# Patient Record
Sex: FEMALE
[De-identification: names, ages, dates, MRNs, and addresses within clinical notes are randomized per-mention and may not be internally consistent; named-entity substitution may affect disease eponyms.]

---

## 2024-07-25 ENCOUNTER — CARE COORDINATION (OUTPATIENT)
Dept: OTHER | Facility: CLINIC | Age: 74
End: 2024-07-25

## 2024-07-25 NOTE — CARE COORDINATION
Ambulatory Care Coordination Note     7/25/2024 10:00 AM     Patient outreach attempt by this AC today to offer care management services. ACM was unable to reach the patient by telephone today; left voice message requesting a return phone call to this ACM.     ACM: Yaquelin Martins RN     Care Summary Note: HIPAA compliant voicemail left for patient.    PCP/Specialist follow up: Unknown      Follow Up:   Plan for next ACM outreach in approximately 1 week to complete:  - outreach attempt to offer care management services.      Yaquelin Martins -[phone]

## 2024-07-25 NOTE — CARE COORDINATION
Ambulatory Care Coordination Note     2024 11:32 AM     Patient Current Location:  [place]     This patient was received as a referral from Population health report .    ACM contacted the patient, spouse/partner  by telephone. Verified name and  with patient as identifiers. Provided introduction to self, and explanation of the ACM role.   Patient declined care management services at this time.          ACM: Yaquelin Martins RN     Challenges to be reviewed by the provider   Additional needs identified to be addressed with provider No  none               Method of communication with provider: none.    Care Summary Note: ACM spoke with the patient, who gave verbal permission to discuss their care with the patient's spouse, Molina. During the conversation, Molina declined HRCM, stating that home health services had already discharged the patient and they had everything they needed. Molina appeared agitated and expressed that the patient was in good hands with him, emphasizing that they did not require any additional assistance or involvement from others. Following this discussion, the ACM signed off.    Yaquelin Martins -[phone]

## 2024-09-06 ENCOUNTER — CARE COORDINATION (OUTPATIENT)
Dept: OTHER | Facility: CLINIC | Age: 74
End: 2024-09-06

## 2024-09-06 NOTE — CARE COORDINATION
Ambulatory Care Coordination Note     9/6/2024 12:32 PM     Patient outreach attempt by this ACM today to offer care management services. ACM was unable to reach the patient by telephone today; left voice message requesting a return phone call to this ACM.     ACM: Marcella Randall RN     Care Summary Note: Unable to reach patient at this time.      PCP/Specialist follow up:       Follow Up:   Plan for next ACM outreach in approximately 1-2 days  to complete:  - outreach attempt to offer care management services.        Marcella Randall RN BSN  [phone]

## 2024-09-09 ENCOUNTER — CARE COORDINATION (OUTPATIENT)
Dept: OTHER | Facility: CLINIC | Age: 74
End: 2024-09-09

## 2024-09-10 ENCOUNTER — CARE COORDINATION (OUTPATIENT)
Dept: OTHER | Facility: CLINIC | Age: 74
End: 2024-09-10

## 2024-09-16 ENCOUNTER — CARE COORDINATION (OUTPATIENT)
Dept: OTHER | Facility: CLINIC | Age: 74
End: 2024-09-16

## 2024-09-18 ENCOUNTER — CARE COORDINATION (OUTPATIENT)
Dept: OTHER | Facility: CLINIC | Age: 74
End: 2024-09-18

## 2024-09-20 ENCOUNTER — CARE COORDINATION (OUTPATIENT)
Dept: OTHER | Facility: CLINIC | Age: 74
End: 2024-09-20

## 2024-09-23 ENCOUNTER — CARE COORDINATION (OUTPATIENT)
Dept: OTHER | Facility: CLINIC | Age: 74
End: 2024-09-23

## 2024-09-23 RX ORDER — MEMANTINE HYDROCHLORIDE 10 MG/1
10 TABLET ORAL 2 TIMES DAILY
COMMUNITY

## 2024-09-23 RX ORDER — SIMVASTATIN 40 MG
40 TABLET ORAL NIGHTLY
COMMUNITY

## 2024-09-23 RX ORDER — ESCITALOPRAM OXALATE 10 MG/1
10 TABLET ORAL DAILY
COMMUNITY

## 2024-09-23 SDOH — ECONOMIC STABILITY: FOOD INSECURITY: WITHIN THE PAST 12 MONTHS, YOU WORRIED THAT YOUR FOOD WOULD RUN OUT BEFORE YOU GOT MONEY TO BUY MORE.: NEVER TRUE

## 2024-09-23 SDOH — ECONOMIC STABILITY: INCOME INSECURITY: IN THE LAST 12 MONTHS, WAS THERE A TIME WHEN YOU WERE NOT ABLE TO PAY THE MORTGAGE OR RENT ON TIME?: NO

## 2024-09-23 SDOH — HEALTH STABILITY: MENTAL HEALTH
STRESS IS WHEN SOMEONE FEELS TENSE, NERVOUS, ANXIOUS, OR CAN'T SLEEP AT NIGHT BECAUSE THEIR MIND IS TROUBLED. HOW STRESSED ARE YOU?: ONLY A LITTLE

## 2024-09-23 SDOH — SOCIAL STABILITY: SOCIAL NETWORK
DO YOU BELONG TO ANY CLUBS OR ORGANIZATIONS SUCH AS CHURCH GROUPS UNIONS, FRATERNAL OR ATHLETIC GROUPS, OR SCHOOL GROUPS?: NO

## 2024-09-23 SDOH — SOCIAL STABILITY: SOCIAL NETWORK: ARE YOU MARRIED, WIDOWED, DIVORCED, SEPARATED, NEVER MARRIED, OR LIVING WITH A PARTNER?: MARRIED

## 2024-09-23 SDOH — ECONOMIC STABILITY: FOOD INSECURITY: WITHIN THE PAST 12 MONTHS, THE FOOD YOU BOUGHT JUST DIDN'T LAST AND YOU DIDN'T HAVE MONEY TO GET MORE.: NEVER TRUE

## 2024-09-23 SDOH — HEALTH STABILITY: MENTAL HEALTH: HOW OFTEN DO YOU HAVE A DRINK CONTAINING ALCOHOL?: NEVER

## 2024-09-23 SDOH — ECONOMIC STABILITY: INCOME INSECURITY: HOW HARD IS IT FOR YOU TO PAY FOR THE VERY BASICS LIKE FOOD, HOUSING, MEDICAL CARE, AND HEATING?: NOT HARD AT ALL

## 2024-09-23 SDOH — ECONOMIC STABILITY: TRANSPORTATION INSECURITY
IN THE PAST 12 MONTHS, HAS THE LACK OF TRANSPORTATION KEPT YOU FROM MEDICAL APPOINTMENTS OR FROM GETTING MEDICATIONS?: NO

## 2024-09-23 SDOH — SOCIAL STABILITY: SOCIAL NETWORK: HOW OFTEN DO YOU ATTEND CHURCH OR RELIGIOUS SERVICES?: NEVER

## 2024-09-23 SDOH — HEALTH STABILITY: MENTAL HEALTH: HOW MANY STANDARD DRINKS CONTAINING ALCOHOL DO YOU HAVE ON A TYPICAL DAY?: PATIENT DOES NOT DRINK

## 2024-09-23 SDOH — HEALTH STABILITY: PHYSICAL HEALTH: ON AVERAGE, HOW MANY MINUTES DO YOU ENGAGE IN EXERCISE AT THIS LEVEL?: 0 MIN

## 2024-09-23 SDOH — SOCIAL STABILITY: SOCIAL NETWORK: IN A TYPICAL WEEK, HOW MANY TIMES DO YOU TALK ON THE PHONE WITH FAMILY, FRIENDS, OR NEIGHBORS?: THREE TIMES A WEEK

## 2024-09-23 SDOH — HEALTH STABILITY: PHYSICAL HEALTH: ON AVERAGE, HOW MANY DAYS PER WEEK DO YOU ENGAGE IN MODERATE TO STRENUOUS EXERCISE (LIKE A BRISK WALK)?: 0 DAYS

## 2024-09-23 SDOH — SOCIAL STABILITY: SOCIAL NETWORK: HOW OFTEN DO YOU ATTENT MEETINGS OF THE CLUB OR ORGANIZATION YOU BELONG TO?: NEVER

## 2024-09-23 SDOH — SOCIAL STABILITY: SOCIAL NETWORK: HOW OFTEN DO YOU GET TOGETHER WITH FRIENDS OR RELATIVES?: THREE TIMES A WEEK

## 2024-09-23 SDOH — ECONOMIC STABILITY: TRANSPORTATION INSECURITY
IN THE PAST 12 MONTHS, HAS LACK OF TRANSPORTATION KEPT YOU FROM MEETINGS, WORK, OR FROM GETTING THINGS NEEDED FOR DAILY LIVING?: NO

## 2024-09-25 ENCOUNTER — CARE COORDINATION (OUTPATIENT)
Dept: OTHER | Facility: CLINIC | Age: 74
End: 2024-09-25

## 2024-09-30 ENCOUNTER — CARE COORDINATION (OUTPATIENT)
Dept: OTHER | Facility: CLINIC | Age: 74
End: 2024-09-30

## 2024-09-30 NOTE — CARE COORDINATION
Ambulatory Care Coordination Note     9/30/2024 10:13 AM     Patient's  called AC and asked when home care company would be coming to their home on Wednesday. Reviewed SW note which states pt's  contacted two agencies. Unsure which companies he had contacted. Spouse states she will look through his paperwork to locate so he can call them.     Marcella Randall RN BSN  [phone]

## 2024-10-09 ENCOUNTER — CARE COORDINATION (OUTPATIENT)
Dept: OTHER | Facility: CLINIC | Age: 74
End: 2024-10-09

## 2024-10-21 ENCOUNTER — CARE COORDINATION (OUTPATIENT)
Dept: OTHER | Facility: CLINIC | Age: 74
End: 2024-10-21

## 2024-10-23 ENCOUNTER — CARE COORDINATION (OUTPATIENT)
Dept: OTHER | Facility: CLINIC | Age: 74
End: 2024-10-23

## 2024-10-28 ENCOUNTER — CARE COORDINATION (OUTPATIENT)
Dept: OTHER | Facility: CLINIC | Age: 74
End: 2024-10-28

## 2024-10-28 NOTE — CARE COORDINATION
Ambulatory Care Coordination Note     10/28/2024 2:10 PM     Spouse/Partner outreach attempt by this ACM today to perform care management follow up . ACM was unable to reach the spouse/partner  by telephone today; left voice message requesting a return phone call to this ACM.     ACM: Marcella Randall RN     Care Summary Note: Unable to reach patient at this time.        Follow Up:   Plan for next ACM outreach in approximately 1 week to complete:  - goal progression.      Marcella Randall RN BSN  [phone]

## 2024-11-01 ENCOUNTER — CARE COORDINATION (OUTPATIENT)
Dept: OTHER | Facility: CLINIC | Age: 74
End: 2024-11-01

## 2024-11-01 NOTE — CARE COORDINATION
MSW made an final outreach attempt today to perform a care management follow-up. MSW was unable to reach the patient by telephone and left a voicemail requesting a return call.    Patient was closed from the Social Work Care Management program on 11/01/2024 due to disengagement. At this time, the patient is deemed able to self-manage.    MSW provided contact information for future needs. No further follow-up call indicated

## 2024-11-04 ENCOUNTER — CARE COORDINATION (OUTPATIENT)
Dept: OTHER | Facility: CLINIC | Age: 74
End: 2024-11-04

## 2024-11-04 NOTE — CARE COORDINATION
Ambulatory Care Coordination Note     2024 10:54 AM     Patient Current Location:  [place]     ACM contacted the spouse/partner by telephone. Verified name and  with patient as identifiers.         ACM: Marcella Randall RN     Challenges to be reviewed by the provider   Additional needs identified to be addressed with provider No  none               Method of communication with provider: chart routing.    Has the patient been seen in the ED since your last call? no    Care Summary Note: Spouse Molina reports things are \"going good.\" He has pursued  referral for pt's expressive aphasia and is awaiting a call back from office (Collin at [facility]) to coordinate treatment schedule. Pt does have some continued wandering tendencies. Neighbors and local police are aware of this and are vigilant in the neighborhood. Spouse reports the house is locked up at night and he has no concern for pt being able to exit house at night. Pt had a fall off deck in which she was carrying bags of clothes, but no injury was sustained. Discussed/educated on safety awareness with regard to devices and upcoming colder temperatures. Pt states he would like to get pt an ID bracelet. ACM provided online options, and spouse will ask his son to order on [employer]. Molina stated they get out of the house for outings and spoke hopefully of traveling they would like to do such as [facility] and [place]. Spouse expressed feelings of overwhelm at times, but no immediate distress currently. Reviewed upcoming Neurology appt in May. Spouse also has upcoming surgery planned in which his son Molina will assist with pt's care needs. Molina is agreeable to CM follow-up in two weeks. Spouse expressed thanks for the call.          Offered patient enrollment in the Remote Patient Monitoring (RPM) program for in-home monitoring: Patient is not eligible for RPM program because: affiliate provider.     Assessments Completed:   Care Coordination Interventions

## 2024-11-21 ENCOUNTER — CARE COORDINATION (OUTPATIENT)
Dept: OTHER | Facility: CLINIC | Age: 74
End: 2024-11-21

## 2024-11-21 NOTE — CARE COORDINATION
Ambulatory Care Coordination Note     11/21/2024 10:01 AM     Spouse/Partner outreach attempt by this AC today to perform care management follow up . ACM was unable to reach the spouse/partner  by telephone today;   left voice message requesting a return phone call to this ACM.     ACM: Marcella Randall RN     Care Summary Note: Unable to reach patient at this time.      PCP/Specialist follow up: Neurology  Future Appointments         Provider Specialty Dept Phone    5/30/2025 9:40 AM PROVIDER, OTHER              Follow Up:   Plan for next AC outreach in approximately 2 weeks to complete:  - goal progression  - follow-up appointment with ST Gladis Randall RN BSN  [phone]

## 2024-12-05 ENCOUNTER — CARE COORDINATION (OUTPATIENT)
Dept: OTHER | Facility: CLINIC | Age: 74
End: 2024-12-05

## 2024-12-05 NOTE — CARE COORDINATION
condition.    Barriers: impairment:  cognitive and stress  Plan for overcoming my barriers: working with ACM  Confidence: 8/10  Anticipated Goal Completion Date: 12/23/24                 PCP/Specialist follow up: Neurology  Future Appointments         Provider Specialty Dept Phone    5/30/2025 9:40 AM PROVIDER, OTHER              Follow Up:   Plan for next ACM outreach in approximately 1 month to complete:  - goal progression  - follow up appointment with ST .   Caregiver is agreeable to this plan.     Marcella Randall RN BSN  Ambulatory Care Manager  [phone]  [email or web address]

## 2024-12-05 NOTE — CARE COORDINATION
Ambulatory Care Coordination Note     12/5/2024 9:08 AM     Spouse/Partner outreach attempt by this AC today to perform care management follow up . ACM was unable to reach the spouse/partner  by telephone today;   left voice message requesting a return phone call to this ACM.     ACM: Marcella Randall RN     Care Summary Note: Unable to reach patient at this time.      PCP/Specialist follow up: Neurology, Dr. Mariya Butler  Future Appointments         Provider Specialty Dept Phone    5/30/2025 9:40 AM PROVIDER, OTHER              Follow Up:   Plan for next AC outreach in approximately 1 week to complete:  - goal progression  - follow-up appointment with ST Gladis Randall RN BSN  [phone]

## 2025-01-02 ENCOUNTER — CARE COORDINATION (OUTPATIENT)
Dept: OTHER | Facility: CLINIC | Age: 75
End: 2025-01-02

## 2025-01-02 NOTE — CARE COORDINATION
Ambulatory Care Coordination Note     2025 3:25 PM     Patient Current Location:  [place]     ACM contacted the spouse/partner by telephone. Verified name and  with spouse/partner as identifiers.         ACM: Marcella Randall RN     Challenges to be reviewed by the provider   Additional needs identified to be addressed with provider Yes  home health care-PT/OT evaluation                Method of communication with provider: phone.    Utilization: Patient has not had any utilization since our last call.     Care Summary Note: Pt's spouse Molina reports pt is \"getting a little worse,\" not as steady, and has had frequent falls, so many that he has \"stopped counting.\" Pt has experienced some bruising related to falls but no significant injuries.  states pt is unable to get up from floor on her own now and he has to lift her up off floor.  is healing from recent UE surgery. He reports pt is eating and drinking well and has had no weight loss or appetite changes. She completed three ST encounters in December and spouse does not believe methods learned to improve pt's communication have been adequately applied yet. Pt has communication recorder at home but doesn't like to use, according to spouse. ACM suggested  call PCP, Dr. Sigifredo Hdez to schedule PCP appt and request home PT/OT due to increased weakness/falls.  states he will call tomorrow to schedule. His goal is to make a trip to [place] with pt in the new year. Will plan to follow-up in one month, and encouraged spouse to call ACM prior, with any questions or concerns. Spouse expressed thanks for the call.       Offered patient enrollment in the Remote Patient Monitoring (RPM) program for in-home monitoring: Patient is not eligible for RPM program because: affiliate provider.     Assessments Completed:       2025     3:23 PM   Amb Fall Risk Assessment and TUG Test   Do you feel unsteady or are you worried about falling?  yes

## 2025-01-31 ENCOUNTER — CARE COORDINATION (OUTPATIENT)
Dept: OTHER | Facility: CLINIC | Age: 75
End: 2025-01-31

## 2025-01-31 NOTE — CARE COORDINATION
Ambulatory Care Coordination Note     1/31/2025 11:11 AM     Spouse/Partner outreach attempt by this ACM today to perform care management follow up . ACM was unable to reach the spouse/partner  by telephone today;   left voice message requesting a return phone call to this ACM.     ACM: Marcella Randall RN     Care Summary Note: Unable to reach patient/spouse at this time.      PCP/Specialist follow up:   Future Appointments         Provider Specialty Dept Phone    5/30/2025 9:40 AM PROVIDER, OTHER              Follow Up:   Plan for next ACM outreach in approximately 1 week to complete:  - goal progression  - follow-up appointment with PCP  -Fall assessment    Marcella Randall RN BSN  [phone]

## 2025-02-03 ENCOUNTER — CARE COORDINATION (OUTPATIENT)
Dept: OTHER | Facility: CLINIC | Age: 75
End: 2025-02-03

## 2025-02-03 NOTE — CARE COORDINATION
Ambulatory Care Coordination Note     2/3/2025 2:38 PM     Called and spoke with Bhumi at PCP office and notified of PT order needed due to frequent falls at home. Bhumi states she will notify the provider and request order to be placed.     Marcella Randall RN BSN  [phone]

## 2025-02-03 NOTE — CARE COORDINATION
Ambulatory Care Coordination Note     2/3/2025 11:26 AM     Patient Current Location:  [place]     Spouse/Partner contacted the ACM by telephone. Verified name and  with spouse/partner as identifiers.         ACM: Marcella Randall RN     Challenges to be reviewed by the provider   Additional needs identified to be addressed with provider Yes  PT-Need order/referral placed               Method of communication with provider: phone.    Utilization: Patient has not had any utilization since our last call.     Care Summary Note: Pt's  Molina returned call to [facility]. Reviewed PCP MA[facility] notes from 25. Molina states he notified NP of falls, have not yet heard back for PT order/referral. Molina is agreeable to [facility] calling office to inquire. He also reports pt had tooth pulled last week and is having no issues. She has not yet had labs/DXA done but does have BP monitor at home now. Molina reports having difficulty with pt agreeing to allow hm to place on her/check BP. He states son was there yesterday and was able to get BP reading of 162/93. ACM provided suggestions to help improve pt adherence.  states he will keep trying.  Appt with Dr. Arcos (Bariatric surgery follow-up) is scheduled for 25. Will follow-up with  once response received from PCP office regarding home therapy.     Offered patient enrollment in the Remote Patient Monitoring (RPM) program for in-home monitoring: Patient is not eligible for RPM program because: affiliate provider.     Assessments Completed:   General Assessment    Do you have any symptoms that are causing concern?: No          Medications Reviewed:   Completed during a previous call     Advance Care Planning:   Reviewed during previous call      Care Planning:    Goals Addressed                   This Visit's Progress     Conditions and Symptoms   On track     I will schedule office visits, as directed by my provider.  I will keep my appointment or reschedule if I have

## 2025-02-05 ENCOUNTER — CARE COORDINATION (OUTPATIENT)
Dept: OTHER | Facility: CLINIC | Age: 75
End: 2025-02-05

## 2025-02-05 NOTE — CARE COORDINATION
Ambulatory Care Coordination Note     2/5/2025 10:25 AM     Noted PCP office placed PT order on 02/03/25 and notified spouse Molina. Will plan to follow-up with spouse in two weeks.     Marcella Randall RN BSN  [phone]

## 2025-02-24 ENCOUNTER — CARE COORDINATION (OUTPATIENT)
Dept: OTHER | Facility: CLINIC | Age: 75
End: 2025-02-24

## 2025-02-24 NOTE — CARE COORDINATION
Ambulatory Care Coordination Note     2/24/2025 2:09 PM     Spouse/Partner outreach attempt by this ACM today to perform care management follow up . ACM was unable to reach the spouse/partner  by telephone today;   left voice message requesting a return phone call to this ACM.     ACM: Marcella Randall RN     Care Summary Note: Unable to reach patient at this time.      PCP/Specialist follow up:   Future Appointments         Provider Specialty Dept Phone    5/30/2025 9:40 AM PROVIDER, OTHER              Follow Up:   Plan for next ACM outreach in approximately 1 week to complete:  - goal progression  -[facility] PT .     Marcella Randall RN BSN  [phone]

## 2025-03-03 ENCOUNTER — CARE COORDINATION (OUTPATIENT)
Dept: OTHER | Facility: CLINIC | Age: 75
End: 2025-03-03

## 2025-03-03 NOTE — CARE COORDINATION
Ambulatory Care Coordination Note     3/3/2025 12:54 PM     Spouse/Partner outreach attempt by this ACM today to perform care management follow up . ACM was unable to reach the spouse/partner  by telephone today;   left voice message requesting a return phone call to this ACM.     ACM: Marcella Randall RN     Care Summary Note: Unable to reach patient at this time.        Follow Up:   Plan for next AC outreach in approximately 1 week to complete:  - goal progression  - follow-up appointment with PCP .      Marcella Randall RN BSN  [phone]

## 2025-03-10 ENCOUNTER — CARE COORDINATION (OUTPATIENT)
Dept: OTHER | Facility: CLINIC | Age: 75
End: 2025-03-10

## 2025-03-10 NOTE — CARE COORDINATION
Ambulatory Care Coordination Note     3/10/2025 10:45 AM     Spouse/Partner outreach attempt by this ACM today to perform care management follow up . ACM was unable to reach the spouse/partner  by telephone today;   left voice message requesting a return phone call to this ACM.     ACM: Marcella Randall RN     Care Summary Note: Unable to reach patient at this time.      PCP/Specialist follow up:   Future Appointments         Provider Specialty Dept Phone    5/30/2025 9:40 AM PROVIDER, OTHER              Follow Up:   Plan for next ACM outreach in approximately 2 weeks to complete:  - goal progression  - follow-up appointment with ST  -Review for graduation .      Marcella Randall RN BSN  [phone]

## 2025-03-24 ENCOUNTER — CARE COORDINATION (OUTPATIENT)
Dept: OTHER | Facility: CLINIC | Age: 75
End: 2025-03-24

## 2025-03-24 NOTE — CARE COORDINATION
Ambulatory Care Coordination Note     3/24/2025 9:50 AM     Closed HRCM program due to multiple unsuccessful attempts to reach spouse. Pt/spouse is progressing toward self management and following-up with appropriate members of the care team. Pt will graduate from [facility] as of this date. Spouse has [facility]'s contact number in case any future needs arise.    Marcella Randall RN BSN  [phone]